# Patient Record
Sex: MALE | Race: WHITE | Employment: FULL TIME | URBAN - NONMETROPOLITAN AREA
[De-identification: names, ages, dates, MRNs, and addresses within clinical notes are randomized per-mention and may not be internally consistent; named-entity substitution may affect disease eponyms.]

---

## 2017-10-23 DIAGNOSIS — Z00.00 ROUTINE GENERAL MEDICAL EXAMINATION AT A HEALTH CARE FACILITY: Primary | ICD-10-CM

## 2017-12-07 ENCOUNTER — TELEPHONE (OUTPATIENT)
Dept: INTERNAL MEDICINE | Age: 57
End: 2017-12-07

## 2017-12-14 DIAGNOSIS — Z00.00 ROUTINE GENERAL MEDICAL EXAMINATION AT A HEALTH CARE FACILITY: ICD-10-CM

## 2017-12-14 LAB
ALBUMIN SERPL-MCNC: 4.2 G/DL (ref 3.5–5.2)
ALP BLD-CCNC: 72 U/L (ref 40–130)
ALT SERPL-CCNC: 16 U/L (ref 5–41)
ANION GAP SERPL CALCULATED.3IONS-SCNC: 13 MMOL/L (ref 7–19)
AST SERPL-CCNC: 16 U/L (ref 5–40)
BILIRUB SERPL-MCNC: 0.5 MG/DL (ref 0.2–1.2)
BILIRUBIN URINE: NEGATIVE
BLOOD, URINE: NEGATIVE
BUN BLDV-MCNC: 20 MG/DL (ref 6–20)
CALCIUM SERPL-MCNC: 8.8 MG/DL (ref 8.6–10)
CHLORIDE BLD-SCNC: 104 MMOL/L (ref 98–111)
CHOLESTEROL, TOTAL: 164 MG/DL (ref 160–199)
CLARITY: CLEAR
CO2: 27 MMOL/L (ref 22–29)
COLOR: YELLOW
CREAT SERPL-MCNC: 0.9 MG/DL (ref 0.5–1.2)
GFR NON-AFRICAN AMERICAN: >60
GLUCOSE BLD-MCNC: 97 MG/DL (ref 74–109)
GLUCOSE URINE: NEGATIVE MG/DL
HCT VFR BLD CALC: 47.4 % (ref 42–52)
HDLC SERPL-MCNC: 57 MG/DL (ref 55–121)
HEMOGLOBIN: 15.9 G/DL (ref 14–18)
KETONES, URINE: NEGATIVE MG/DL
LDL CHOLESTEROL CALCULATED: 87 MG/DL
LEUKOCYTE ESTERASE, URINE: NEGATIVE
MCH RBC QN AUTO: 29.2 PG (ref 27–31)
MCHC RBC AUTO-ENTMCNC: 33.5 G/DL (ref 33–37)
MCV RBC AUTO: 87 FL (ref 80–94)
NITRITE, URINE: NEGATIVE
PDW BLD-RTO: 12.7 % (ref 11.5–14.5)
PH UA: 6
PLATELET # BLD: 205 K/UL (ref 130–400)
PMV BLD AUTO: 11.4 FL (ref 9.4–12.4)
POTASSIUM SERPL-SCNC: 4.1 MMOL/L (ref 3.5–5)
PROSTATE SPECIFIC ANTIGEN: 5.77 NG/ML (ref 0–4)
PROTEIN UA: NEGATIVE MG/DL
RBC # BLD: 5.45 M/UL (ref 4.7–6.1)
SODIUM BLD-SCNC: 144 MMOL/L (ref 136–145)
SPECIFIC GRAVITY UA: 1.02
TOTAL PROTEIN: 6.8 G/DL (ref 6.6–8.7)
TRIGL SERPL-MCNC: 102 MG/DL (ref 0–149)
TSH SERPL DL<=0.05 MIU/L-ACNC: 2.69 UIU/ML (ref 0.27–4.2)
UROBILINOGEN, URINE: 0.2 E.U./DL
WBC # BLD: 5 K/UL (ref 4.8–10.8)

## 2017-12-21 ENCOUNTER — OFFICE VISIT (OUTPATIENT)
Dept: INTERNAL MEDICINE | Age: 57
End: 2017-12-21
Payer: COMMERCIAL

## 2017-12-21 VITALS
WEIGHT: 174 LBS | HEART RATE: 76 BPM | BODY MASS INDEX: 25.77 KG/M2 | DIASTOLIC BLOOD PRESSURE: 60 MMHG | HEIGHT: 69 IN | SYSTOLIC BLOOD PRESSURE: 110 MMHG | OXYGEN SATURATION: 98 %

## 2017-12-21 DIAGNOSIS — M19.90 OSTEOARTHRITIS, UNSPECIFIED OSTEOARTHRITIS TYPE, UNSPECIFIED SITE: ICD-10-CM

## 2017-12-21 DIAGNOSIS — K21.9 GASTROESOPHAGEAL REFLUX DISEASE WITHOUT ESOPHAGITIS: ICD-10-CM

## 2017-12-21 DIAGNOSIS — E78.00 HYPERCHOLESTEREMIA: Primary | ICD-10-CM

## 2017-12-21 DIAGNOSIS — N40.0 BENIGN PROSTATIC HYPERPLASIA WITHOUT LOWER URINARY TRACT SYMPTOMS: ICD-10-CM

## 2017-12-21 DIAGNOSIS — R97.20 ELEVATED PSA: ICD-10-CM

## 2017-12-21 PROBLEM — K58.8 OTHER IRRITABLE BOWEL SYNDROME: Status: ACTIVE | Noted: 2017-12-21

## 2017-12-21 PROCEDURE — 99215 OFFICE O/P EST HI 40 MIN: CPT | Performed by: INTERNAL MEDICINE

## 2017-12-21 ASSESSMENT — ENCOUNTER SYMPTOMS
WHEEZING: 0
ABDOMINAL DISTENTION: 0
SORE THROAT: 0
ABDOMINAL PAIN: 0
NAUSEA: 0
TROUBLE SWALLOWING: 0
EYE DISCHARGE: 0
VOMITING: 0
BLOOD IN STOOL: 0
SHORTNESS OF BREATH: 0
BACK PAIN: 0
EYE ITCHING: 0
SINUS PRESSURE: 0

## 2017-12-21 ASSESSMENT — PATIENT HEALTH QUESTIONNAIRE - PHQ9
1. LITTLE INTEREST OR PLEASURE IN DOING THINGS: 0
SUM OF ALL RESPONSES TO PHQ9 QUESTIONS 1 & 2: 0
2. FEELING DOWN, DEPRESSED OR HOPELESS: 0
SUM OF ALL RESPONSES TO PHQ QUESTIONS 1-9: 0

## 2017-12-21 NOTE — PROGRESS NOTES
[facility] INTERNAL MEDICINE  [address] [facility] [address]  Dept: [phone]  Dept Fax: [phone]: [phone]      Visit Date: 12/21/2017    Stevie Jamison is a 62 y.o. 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 Allyssa Ferrara who is in for his annual exam. He has chronic gastroesophageal reflux and takes Nexium on a when necessary basis. He also has a history of BPH and had to have a TURP back in 2009 in [place] due to obstructive symptoms. He has osteoarthritis with what sounds like olecranon bursitis in his left elbow and otherwise he's doing fine with no evidence of any complaints other than some postnasal drip    Social History   Substance Use Topics    Smoking status: Never Smoker    Smokeless tobacco: Never Used    Alcohol use Not on file      Current Outpatient Prescriptions   Medication Sig Dispense Refill    Esomeprazole Magnesium (NEXIUM 24HR PO) Take by mouth       No current facility-administered medications for this visit. No Known Allergies      Subjective:      Review of Systems   Constitutional: Negative for activity change, appetite change, fatigue and fever. HENT: Positive for postnasal drip. Negative for congestion, hearing loss, sinus pressure, sore throat and trouble swallowing. Eyes: Negative for discharge and itching. Respiratory: Negative for shortness of breath and wheezing. Cardiovascular: Negative for chest pain, palpitations and leg swelling. Gastrointestinal: Negative for abdominal distention, abdominal pain, blood in stool, nausea and vomiting. Endocrine: Negative for cold intolerance, heat intolerance and polydipsia. Genitourinary: Negative for flank pain, frequency, hematuria and urgency. Musculoskeletal: Positive for arthralgias. Negative for back pain and joint swelling.    Skin: Negative for rash and wound. Allergic/Immunologic: Negative for environmental allergies and food allergies. Neurological: Negative for dizziness, tremors, syncope, weakness, numbness and headaches. Hematological: Negative for adenopathy. Psychiatric/Behavioral: Negative for agitation and hallucinations. The patient is not nervous/anxious. Objective:     /60   Pulse 76   Ht 5' 9\" (1.753 m)   Wt 174 lb (78.9 kg)   SpO2 98%   BMI 25.70 kg/m²    Physical Exam   Constitutional: He is oriented to person, place, and time. He appears well-developed and well-nourished. No distress. HENT:   Head: Normocephalic and atraumatic. Right Ear: External ear normal.   Left Ear: External ear normal.   Nose: Nose normal.   Mouth/Throat: Oropharynx is clear and moist. No oropharyngeal exudate. Eyes: Conjunctivae and EOM are normal. Pupils are equal, round, and reactive to light. Right eye exhibits no discharge. Left eye exhibits no discharge. No scleral icterus. Neck: Normal range of motion. Neck supple. No JVD present. No tracheal deviation present. No thyromegaly present. Cardiovascular: Normal rate, regular rhythm, normal heart sounds and intact distal pulses. Exam reveals no gallop and no friction rub. No murmur heard. Pulmonary/Chest: Effort normal and breath sounds normal. No respiratory distress. He has no wheezes. He has no rales. Abdominal: Soft. Bowel sounds are normal. He exhibits no distension and no mass. There is no tenderness. There is no rebound and no guarding. Genitourinary:   Genitourinary Comments: Enlarged prostate with no evidence of any nodules   Musculoskeletal: Normal range of motion. He exhibits tenderness. He exhibits no edema or deformity. Elbow olecranon bursitis on left   Lymphadenopathy:     He has no cervical adenopathy. Neurological: He is alert and oriented to person, place, and time. He has normal reflexes. No cranial nerve deficit. He exhibits normal muscle tone. Coordination normal.   Skin: Skin is warm and dry. No rash noted. He is not diaphoretic. No erythema. No pallor. Psychiatric: He has a normal mood and affect. His behavior is normal. Judgment and thought content normal.        Assessment:     1. Gastroesophageal reflux disease without esophagitis     2. Benign prostatic hyperplasia without lower urinary tract symptoms     3. Osteoarthritis, unspecified osteoarthritis type, unspecified site     4. Elevated PSA  Amb External Referral To Urology            Plan:      Gastroesophageal reflux which is stable on his current dose of Nexium which she is taking when necessary. He will continue the same. BPH without any lower tract symptoms at this time. He has had a TURP in the past and he has seen Dr. Reggie Mckeon here in town. Osteoarthritis of the left elbow consistent with  olecranon bursitis area I told him to get an elbow strap to wear and use moist heat and ibuprofen on a when necessary basis. Elevated PSA. His PSA last year was 3.6 this year he is 5.77. He sees Dr. Reggie Mckeon and with his history of a TURP at a young age of [de-identified] needs to get back into see Dr. Reggie Mckeon again for follow-up on this elevation. make a referral back. She appears to be doing well. With the elevation of his PSA is being the only finding today were going to send him back to Dr. Reggie Mckeon and have him follow-up with Dr. Patel Spotted again in a year. Return in about 1 year (around 12/21/2018) for liver ,lipid check. Patient given educational materials - see patient instructions. Discussed use, benefit, and side effects of prescribed medications. All patient questions answered. Pt voiced understanding. Reviewed health maintenance. Instructed to continue current medications, diet and exercise. Patient agreed with treatment plan. **This report has been created using voice recognition software. It may contain minor errors which are inherent in voice recognition technology. **    Electronically signed by Scott Kulkarni MD on 12/21/2017 at 9:51 AM

## 2017-12-21 NOTE — PROGRESS NOTES
Visit Information    Have you changed or started any medications since your last visit including any over-the-counter medicines, vitamins, or herbal medicines? no   Are you having any side effects from any of your medications? -  no  Have you stopped taking any of your medications? Is so, why? -  no    Have you seen any other physician or provider since your last visit? No  Have you had any other diagnostic tests since your last visit? No  Have you been seen in the emergency room and/or had an admission to a hospital since we last saw you? No  Have you had your routine dental cleaning in the past 6 months? no    Have you activated your Departing account? If not, what are your barriers?  Yes     Patient Care Team:  Renee Hernandez MD as PCP - General (Internal Medicine)    Medical History Review  Past Medical, Family, and Social History reviewed and does not contribute to the patient presenting condition    Health Maintenance   Topic Date Due    Hepatitis C screen  1960    HIV screen  11/14/1975    Colon cancer screen colonoscopy  11/14/2010    Flu vaccine (1) 09/01/2017    Lipid screen  12/14/2022    DTaP/Tdap/Td vaccine (2 - Td) 08/05/2024

## 2018-01-18 ENCOUNTER — OFFICE VISIT (OUTPATIENT)
Dept: UROLOGY | Facility: CLINIC | Age: 58
End: 2018-01-18

## 2018-01-18 VITALS
BODY MASS INDEX: 34.95 KG/M2 | WEIGHT: 178 LBS | HEIGHT: 60 IN | TEMPERATURE: 97.2 F | DIASTOLIC BLOOD PRESSURE: 78 MMHG | SYSTOLIC BLOOD PRESSURE: 138 MMHG

## 2018-01-18 DIAGNOSIS — N40.1 BPH WITH URINARY OBSTRUCTION: ICD-10-CM

## 2018-01-18 DIAGNOSIS — R97.20 ELEVATED PROSTATE SPECIFIC ANTIGEN (PSA): Primary | ICD-10-CM

## 2018-01-18 DIAGNOSIS — N13.8 BPH WITH URINARY OBSTRUCTION: ICD-10-CM

## 2018-01-18 LAB
BILIRUB BLD-MCNC: NEGATIVE MG/DL
CLARITY, POC: CLEAR
COLOR UR: YELLOW
GLUCOSE UR STRIP-MCNC: NEGATIVE MG/DL
KETONES UR QL: NEGATIVE
LEUKOCYTE EST, POC: NEGATIVE
NITRITE UR-MCNC: NEGATIVE MG/ML
PH UR: 7 [PH] (ref 5–8)
PROT UR STRIP-MCNC: NEGATIVE MG/DL
RBC # UR STRIP: NEGATIVE /UL
SP GR UR: 1.02 (ref 1–1.03)
UROBILINOGEN UR QL: NORMAL

## 2018-01-18 PROCEDURE — 99214 OFFICE O/P EST MOD 30 MIN: CPT | Performed by: UROLOGY

## 2018-01-18 PROCEDURE — 81003 URINALYSIS AUTO W/O SCOPE: CPT | Performed by: UROLOGY

## 2018-01-18 RX ORDER — CEPHALEXIN 500 MG/1
500 CAPSULE ORAL 3 TIMES DAILY
Qty: 9 CAPSULE | Refills: 0 | Status: SHIPPED | OUTPATIENT
Start: 2018-01-18 | End: 2018-01-21

## 2018-01-18 NOTE — PROGRESS NOTES
"Subjective    Mr. Woodard is 57 y.o. male    Chief Complaint: Elevated PSA    History of Present Illness     Elevated PSA  Patient is here with an elevated PSA. The PSA was drawn4 week(s). He does not have a family history of prostate cancer. His AUA Symptom Score is 6 /35. Voiding symptoms include Frequency. Denies Incomplete emptying, Intermittency, Urgency, Straining and Nocturia. Voiding symptoms began several years  . These have been gradual in onset. none  Previous PSA values are : 5.77  No results found for: PSA         The following portions of the patient's history were reviewed and updated as appropriate: allergies, current medications, past family history, past medical history, past social history, past surgical history and problem list.    Review of Systems   Constitutional: Negative for chills and fever.   Gastrointestinal: Negative for abdominal pain, anal bleeding and blood in stool.   Genitourinary: Negative for decreased urine volume, flank pain, hematuria and urgency.       No current outpatient prescriptions on file.    History reviewed. No pertinent past medical history.    Past Surgical History:   Procedure Laterality Date   • PROSTATE SURGERY      TURP       Social History     Social History   • Marital status:      Spouse name: N/A   • Number of children: N/A   • Years of education: N/A     Social History Main Topics   • Smoking status: Never Smoker   • Smokeless tobacco: None   • Alcohol use No   • Drug use: None   • Sexual activity: Not Asked     Other Topics Concern   • None     Social History Narrative   • None       Family History   Problem Relation Age of Onset   • No Known Problems Father        Objective    /78  Temp 97.2 °F (36.2 °C)  Ht 69 cm (27.17\")  Wt 80.7 kg (178 lb)  .59 kg/m2    Physical Exam   Constitutional: He is oriented to person, place, and time. He appears well-developed and well-nourished.   Pulmonary/Chest: Effort normal.   Abdominal: Soft. He " exhibits no distension and no mass. There is no tenderness. There is no rebound and no guarding. No hernia.   Genitourinary: Penis normal. Rectal exam shows no mass, no tenderness and anal tone normal. Enlarged: for the age of the patient. Right testis shows no mass, no swelling and no tenderness. Left testis shows no mass, no swelling and no tenderness. No hypospadias. No discharge found.   Genitourinary Comments:  The urethral meatus normal in position without evidence of stricture. Epididymis without mass or tenderness. Vas Deferens is palpably normal.Anus and perineum without mass or tenderness. The prostate is approximately 50 ml. It is Symmetric, with a Soft consistency. There are no nodules present. . The seminal vesicles are Not palpable due to the size of the prostate.     Neurological: He is alert and oriented to person, place, and time.   Vitals reviewed.          Results for orders placed or performed in visit on 01/18/18   POC Urinalysis Dipstick, Automated   Result Value Ref Range    Color Yellow Yellow, Straw, Dark Yellow, Yanira    Clarity, UA Clear Clear    Glucose, UA Negative Negative, 1000 mg/dL (3+) mg/dL    Bilirubin Negative Negative    Ketones, UA Negative Negative    Specific Gravity  1.020 1.005 - 1.030    Blood, UA Negative Negative    pH, Urine 7.0 5.0 - 8.0    Protein, POC Negative Negative mg/dL    Urobilinogen, UA Normal Normal    Leukocytes Negative Negative    Nitrite, UA Negative Negative     Assessment and Plan    Corby was seen today for elevated psa.    Diagnoses and all orders for this visit:    Elevated prostate specific antigen (PSA)  -     POC Urinalysis Dipstick, Automated    BPH with urinary obstruction                Patient last seen in September 2015.  He returns today after having a PSA drawn from his primary care physician that showed a level 5.77.  He has a history of undergoing a TURP in 2009 by Dr. Lopez.  His AUA symptom score is 6/35 with frequency and weak  stream.After discussion with the patient we have opted to proceed with prostate biopsy.  We discussed risk of hematuria hematochezia and hematospermia.  Discussed the 4% risk of sepsis.  We will proceed with this in the very near future.

## 2018-02-07 ENCOUNTER — TELEPHONE (OUTPATIENT)
Dept: UROLOGY | Facility: CLINIC | Age: 58
End: 2018-02-07

## 2018-02-07 NOTE — TELEPHONE ENCOUNTER
Attempted to call patient and went straight to voicemail and I was unable to leave a voicemail. Patient is schd for a bx 2/8/18 at 7:30. Need to confirm he has started antibiotics, remind him to do a fleets enema, and no asa/bt starting 7 days prior

## 2018-02-08 ENCOUNTER — PROCEDURE VISIT (OUTPATIENT)
Dept: UROLOGY | Facility: CLINIC | Age: 58
End: 2018-02-08

## 2018-02-08 DIAGNOSIS — R97.20 ELEVATED PROSTATE SPECIFIC ANTIGEN (PSA): Primary | ICD-10-CM

## 2018-02-08 LAB
BILIRUB BLD-MCNC: NEGATIVE MG/DL
CLARITY, POC: CLEAR
COLOR UR: YELLOW
GLUCOSE UR STRIP-MCNC: NEGATIVE MG/DL
KETONES UR QL: NEGATIVE
LEUKOCYTE EST, POC: NEGATIVE
NITRITE UR-MCNC: NEGATIVE MG/ML
PH UR: 6 [PH] (ref 5–8)
PROT UR STRIP-MCNC: NEGATIVE MG/DL
RBC # UR STRIP: NEGATIVE /UL
SP GR UR: 1.01 (ref 1–1.03)
UROBILINOGEN UR QL: NORMAL

## 2018-02-08 PROCEDURE — 76872 US TRANSRECTAL: CPT | Performed by: UROLOGY

## 2018-02-08 PROCEDURE — 81003 URINALYSIS AUTO W/O SCOPE: CPT | Performed by: UROLOGY

## 2018-02-08 PROCEDURE — G0416 PROSTATE BIOPSY, ANY MTHD: HCPCS | Performed by: UROLOGY

## 2018-02-08 PROCEDURE — 76942 ECHO GUIDE FOR BIOPSY: CPT | Performed by: UROLOGY

## 2018-02-08 PROCEDURE — 96372 THER/PROPH/DIAG INJ SC/IM: CPT | Performed by: UROLOGY

## 2018-02-08 PROCEDURE — 55700 PR PROSTATE NEEDLE BIOPSY ANY APPROACH: CPT | Performed by: UROLOGY

## 2018-02-08 RX ORDER — GENTAMICIN SULFATE 40 MG/ML
80 INJECTION, SOLUTION INTRAMUSCULAR; INTRAVENOUS EVERY 12 HOURS
Status: COMPLETED | OUTPATIENT
Start: 2018-02-08 | End: 2018-02-08

## 2018-02-08 RX ADMIN — GENTAMICIN SULFATE 80 MG: 40 INJECTION, SOLUTION INTRAMUSCULAR; INTRAVENOUS at 10:21

## 2018-02-08 NOTE — PROGRESS NOTES
Indications:  Elevated PSA    Pre-operative prep:  fleets enema, oral antibiotic, IM gentamicin and stopped aspirin, coumadin, and other anticoagulants    Last PSA:  5.77    Procedure:    After proper identification of patient and procedure, patient was placed in the left later decubitus position.  2% lidocaine jelly was instilled per rectum  for topical anesthesia.  The ultrasound probe was gently inserted per rectum. Prostate was scanned from the base of the bladder to the apex.  20 cc of 1% lidocaine plain was then used to perform a prostate nerve block injecting the junction of the seminal vesicle and bladder laterally.      Prostate length: 4.8 cm    Prostate width: 4.99 cm    Prostate height: 2.84 cm    Prostate volume: 35.5 cc    PSA density: 0.16    Abnormal findings:  Hypoechoic lesions right base and TUR Defect    Median lobe:  no    A total of 12 biopsies were taken from the base, apex, and mid portion of the gland on both the right and the left sides.     Patient tolerated the procedure well    Complications: none    Estimated blood loss: minimal    Mr. Woodard was given instructions for follow up.  He will notify the office if he has excessive hematuria, hematochezia, fevers, perineal, or abdominal pain.    Follow up:   Call with pathology results and schedule follow up.

## 2018-02-09 LAB
CYTO UR: NORMAL
LAB AP CASE REPORT: NORMAL
LAB AP DIAGNOSIS COMMENT: NORMAL
LAB AP INTRADEPARTMENTAL CONSULT: NORMAL
Lab: NORMAL
PATH REPORT.FINAL DX SPEC: NORMAL
PATH REPORT.GROSS SPEC: NORMAL

## 2018-02-12 DIAGNOSIS — R97.20 ELEVATED PROSTATE SPECIFIC ANTIGEN (PSA): Primary | ICD-10-CM

## 2018-02-15 ENCOUNTER — TELEPHONE (OUTPATIENT)
Dept: UROLOGY | Facility: CLINIC | Age: 58
End: 2018-02-15

## 2018-02-15 NOTE — TELEPHONE ENCOUNTER
I spoke to patient and made him an appointment for tomorrow morning at 8. He will go to the ER if symptoms worsen.

## 2018-02-15 NOTE — TELEPHONE ENCOUNTER
----- Message from Samanta Davila sent at 2/15/2018  2:33 PM CST -----  Patient had a biopsy last week. He said for awhile he had burning when he urinates. Now he is having difficulty urinating. He wants to know if this is normal after a week for a biopsy. He can be reached on [phone].

## 2018-02-16 ENCOUNTER — OFFICE VISIT (OUTPATIENT)
Dept: UROLOGY | Facility: CLINIC | Age: 58
End: 2018-02-16

## 2018-02-16 VITALS
BODY MASS INDEX: 26.22 KG/M2 | HEIGHT: 69 IN | DIASTOLIC BLOOD PRESSURE: 78 MMHG | WEIGHT: 177 LBS | TEMPERATURE: 97.8 F | SYSTOLIC BLOOD PRESSURE: 130 MMHG

## 2018-02-16 DIAGNOSIS — N41.0 ACUTE PROSTATITIS: ICD-10-CM

## 2018-02-16 DIAGNOSIS — R97.20 ELEVATED PSA: ICD-10-CM

## 2018-02-16 DIAGNOSIS — N13.8 BPH WITH URINARY OBSTRUCTION: ICD-10-CM

## 2018-02-16 DIAGNOSIS — R39.198 DIFFICULTY URINATING: Primary | ICD-10-CM

## 2018-02-16 DIAGNOSIS — N40.1 BPH WITH URINARY OBSTRUCTION: ICD-10-CM

## 2018-02-16 LAB
BILIRUB BLD-MCNC: NEGATIVE MG/DL
CLARITY, POC: CLEAR
COLOR UR: YELLOW
GLUCOSE UR STRIP-MCNC: NEGATIVE MG/DL
KETONES UR QL: NEGATIVE
LEUKOCYTE EST, POC: NEGATIVE
NITRITE UR-MCNC: NEGATIVE MG/ML
PH UR: 6.5 [PH] (ref 5–8)
PROT UR STRIP-MCNC: NEGATIVE MG/DL
RBC # UR STRIP: ABNORMAL /UL
SP GR UR: 1.02 (ref 1–1.03)
UROBILINOGEN UR QL: NORMAL

## 2018-02-16 PROCEDURE — 87086 URINE CULTURE/COLONY COUNT: CPT | Performed by: UROLOGY

## 2018-02-16 PROCEDURE — 87186 SC STD MICRODIL/AGAR DIL: CPT | Performed by: UROLOGY

## 2018-02-16 PROCEDURE — 87088 URINE BACTERIA CULTURE: CPT | Performed by: UROLOGY

## 2018-02-16 PROCEDURE — 81003 URINALYSIS AUTO W/O SCOPE: CPT | Performed by: UROLOGY

## 2018-02-16 PROCEDURE — 99214 OFFICE O/P EST MOD 30 MIN: CPT | Performed by: UROLOGY

## 2018-02-16 PROCEDURE — 51798 US URINE CAPACITY MEASURE: CPT | Performed by: UROLOGY

## 2018-02-16 RX ORDER — SULFAMETHOXAZOLE AND TRIMETHOPRIM 800; 160 MG/1; MG/1
1 TABLET ORAL 2 TIMES DAILY
Qty: 90 TABLET | Refills: 0 | Status: SHIPPED | OUTPATIENT
Start: 2018-02-16 | End: 2018-03-30

## 2018-02-16 RX ORDER — TAMSULOSIN HYDROCHLORIDE 0.4 MG/1
1 CAPSULE ORAL NIGHTLY
Qty: 30 CAPSULE | Refills: 1 | Status: SHIPPED | OUTPATIENT
Start: 2018-02-16 | End: 2018-03-30

## 2018-02-16 NOTE — PROGRESS NOTES
Subjective    Mr. Woodard is 57 y.o. male    Chief Complaint: LUTS/Elevated PSA/BPH    History of Present Illness     Elevated PSA  Patient is here with an elevated PSA. The PSA was drawn4 week(s). He does not have a family history of prostate cancer. His AUA Symptom Score is 23 /35. Voiding symptoms include Frequency. Denies Incomplete emptying, Intermittency, Urgency, Straining and Nocturia. Voiding symptoms began several years  . These have been gradual in onset. Negative bx 2/18  Previous PSA values are : 5.77    The following portions of the patient's history were reviewed and updated as appropriate: allergies, current medications, past family history, past medical history, past social history, past surgical history and problem list.    Review of Systems   Constitutional: Negative for chills and fever.   Gastrointestinal: Negative for abdominal pain, anal bleeding and blood in stool.   Genitourinary: Positive for decreased urine volume and frequency. Negative for flank pain and hematuria.         Current Outpatient Prescriptions:   •  sulfamethoxazole-trimethoprim (BACTRIM DS) 800-160 MG per tablet, Take 1 tablet by mouth 2 (Two) Times a Day for 45 days., Disp: 90 tablet, Rfl: 0  •  tamsulosin (FLOMAX) 0.4 MG capsule 24 hr capsule, Take 1 capsule by mouth Every Night., Disp: 30 capsule, Rfl: 1    History reviewed. No pertinent past medical history.    Past Surgical History:   Procedure Laterality Date   • PROSTATE SURGERY      TURP       Social History     Social History   • Marital status:      Spouse name: N/A   • Number of children: N/A   • Years of education: N/A     Social History Main Topics   • Smoking status: Never Smoker   • Smokeless tobacco: Never Used   • Alcohol use No   • Drug use: None   • Sexual activity: Not Asked     Other Topics Concern   • None     Social History Narrative       Family History   Problem Relation Age of Onset   • No Known Problems Father        Objective    /78   "Temp 97.8 °F (36.6 °C)  Ht 175.3 cm (69\")  Wt 80.3 kg (177 lb)  BMI 26.14 kg/m2    Physical Exam   Constitutional: He is oriented to person, place, and time. He appears well-developed and well-nourished.   Pulmonary/Chest: Effort normal.   Abdominal: Soft. He exhibits no distension and no mass. There is no tenderness. There is no rebound and no guarding. No hernia.   Genitourinary: Penis normal. Rectal exam shows no mass, no tenderness and anal tone normal. Enlarged: for the age of the patient. Right testis shows no mass, no swelling and no tenderness. Left testis shows no mass, no swelling and no tenderness. No hypospadias. No discharge found.   Genitourinary Comments:  The urethral meatus normal in position without evidence of stricture. Epididymis without mass or tenderness. Vas Deferens is palpably normal.Anus and perineum without mass or tenderness. The prostate is approximately 35 ml. It is Symmetric, with a Boggy consistency. There are no nodules present. . The seminal vesicles are Not palpable due to the size of the prostate.     Neurological: He is alert and oriented to person, place, and time.   Vitals reviewed.          Results for orders placed or performed in visit on 02/16/18   POC Urinalysis Dipstick, Automated   Result Value Ref Range    Color Yellow Yellow, Straw, Dark Yellow, Yanira    Clarity, UA Clear Clear    Glucose, UA Negative Negative, 1000 mg/dL (3+) mg/dL    Bilirubin Negative Negative    Ketones, UA Negative Negative    Specific Gravity  1.020 1.005 - 1.030    Blood, UA Small (A) Negative    pH, Urine 6.5 5.0 - 8.0    Protein, POC Negative Negative mg/dL    Urobilinogen, UA Normal Normal    Leukocytes Negative Negative    Nitrite, UA Negative Negative     Assessment and Plan  Bladder Scan interpretation  Estimation of residual urine via abdominal ultrasound  Residual Urine: 0 ml  Indication: difficulty urinating  Position: Supine  Examination: Incremental scanning of the suprapubic area " using 3 MHz transducer using copious amounts of acoustic gel.   Findings: An anechoic area was demonstrated which represented the bladder, with measurement of residual urine as noted. I inspected this myself. In that the residual urine was stable or insignificant, no treatment will be necessary at this time.       Corby was seen today for urinary retention.    Diagnoses and all orders for this visit:    Difficulty urinating  -     POC Urinalysis Dipstick, Automated  -     Urine Culture - Urine, Urine, Clean Catch    BPH with urinary obstruction  -     tamsulosin (FLOMAX) 0.4 MG capsule 24 hr capsule; Take 1 capsule by mouth Every Night.    Elevated PSA    Acute prostatitis  -     sulfamethoxazole-trimethoprim (BACTRIM DS) 800-160 MG per tablet; Take 1 tablet by mouth 2 (Two) Times a Day for 45 days.            Patient with post prostate biopsy infection.  I think clinically he has prostatitis and will treat him for 6 weeks of Bactrim.  Also start him on Flomax.  He has self cath before it is okay with doing this.  He is okay to do this on a when necessary basis.  He will follow-up with me in 6 weeks

## 2018-02-18 LAB
BACTERIA SPEC AEROBE CULT: ABNORMAL
BACTERIA SPEC AEROBE CULT: ABNORMAL

## 2018-02-19 ENCOUNTER — TELEPHONE (OUTPATIENT)
Dept: UROLOGY | Facility: CLINIC | Age: 58
End: 2018-02-19

## 2018-02-19 DIAGNOSIS — N41.9 PROSTATITIS, UNSPECIFIED PROSTATITIS TYPE: Primary | ICD-10-CM

## 2018-02-19 RX ORDER — NITROFURANTOIN MACROCRYSTALS 100 MG/1
100 CAPSULE ORAL 2 TIMES DAILY
Qty: 30 CAPSULE | Refills: 1 | Status: SHIPPED | OUTPATIENT
Start: 2018-02-19 | End: 2018-03-21

## 2018-03-26 NOTE — PROGRESS NOTES
"Subjective    Mr. Woodard is 57 y.o. male    Chief Complaint: LUTS/Elevated PSA/BPH    History of Present Illness    Elevated PSA  Patient is here with an elevated PSA. The PSA was drawn4 week(s). He does not have a family history of prostate cancer. His AUA Symptom Score is 16/35. Voiding symptoms include Frequency. Denies Incomplete emptying, Intermittency, Urgency, Straining and Nocturia. Voiding symptoms began several years  . These have been gradual in onset. Negative bx 2/18  Previous PSA values are : 5.77     The following portions of the patient's history were reviewed and updated as appropriate: allergies, current medications, past family history, past medical history, past social history, past surgical history and problem list.    Review of Systems   Constitutional: Negative for chills and fever.   Gastrointestinal: Negative for abdominal pain, anal bleeding and blood in stool.   Genitourinary: Positive for dysuria and urgency. Negative for flank pain and hematuria.       No current outpatient prescriptions on file.    History reviewed. No pertinent past medical history.    Past Surgical History:   Procedure Laterality Date   • PROSTATE SURGERY      TURP       Social History     Social History   • Marital status:      Social History Main Topics   • Smoking status: Never Smoker   • Smokeless tobacco: Never Used   • Alcohol use No   • Drug use: Unknown     Other Topics Concern   • Not on file       Family History   Problem Relation Age of Onset   • No Known Problems Father        Objective    Temp 97.3 °F (36.3 °C)   Ht 175.3 cm (69\")   Wt 116 kg (255 lb)   BMI 37.66 kg/m²     Physical Exam   Constitutional: He is oriented to person, place, and time. He appears well-developed and well-nourished.   Pulmonary/Chest: Effort normal.   Abdominal: Soft. He exhibits no distension and no mass. There is no tenderness. There is no rebound and no guarding. No hernia.   Genitourinary: Penis normal. Rectal exam " shows no mass, no tenderness and anal tone normal. Enlarged: for the age of the patient. Right testis shows no mass, no swelling and no tenderness. Left testis shows no mass, no swelling and no tenderness. No hypospadias. No discharge found.   Genitourinary Comments:  The urethral meatus normal in position without evidence of stricture. Epididymis without mass or tenderness. Vas Deferens is palpably normal.Anus and perineum without mass or tenderness. The prostate is approximately 40 ml. It is Symmetric, with a Soft consistency. There are no nodules present. . The seminal vesicles are Not palpable due to the size of the prostate.     Neurological: He is alert and oriented to person, place, and time.   Vitals reviewed.          Results for orders placed or performed in visit on 03/30/18   POC Urinalysis Dipstick, Automated   Result Value Ref Range    Color Yellow Yellow, Straw, Dark Yellow, Yanira    Clarity, UA Cloudy (A) Clear    Glucose, UA Negative Negative, 1000 mg/dL (3+) mg/dL    Bilirubin Negative Negative    Ketones, UA Negative Negative    Specific Gravity  1.020 1.005 - 1.030    Blood, UA Moderate (A) Negative    pH, Urine 7.0 5.0 - 8.0    Protein,  mg/dL (A) Negative mg/dL    Urobilinogen, UA Normal Normal    Leukocytes Large (3+) (A) Negative    Nitrite, UA Negative Negative     Assessment and Plan    Diagnoses and all orders for this visit:    Elevated PSA  -     POC Urinalysis Dipstick, Automated  -     PSA, Total & Free; Future    BPH with urinary obstruction    Dysuria  -     Urine Culture - Urine, Urine, Clean Catch        Patient with a history of TURP in 2009 by Dr. Lopez.  He had a PSA of 5.77 and February 2018 underwent a prostate biopsy which was negative for any malignancy.       His biopsy was complicated by a post-biopsy prostatitis and he has had 6 weeks of Macrodantin for Enterococcus.  His urine today does have moderate blood and large leukocytes.  He still having some dysuria.I  will send today's urine for culture.  He will follow-up in 6 months with PSA and percent free.

## 2018-03-30 ENCOUNTER — OFFICE VISIT (OUTPATIENT)
Dept: UROLOGY | Facility: CLINIC | Age: 58
End: 2018-03-30

## 2018-03-30 VITALS — TEMPERATURE: 97.3 F | BODY MASS INDEX: 25.92 KG/M2 | WEIGHT: 175 LBS | HEIGHT: 69 IN

## 2018-03-30 DIAGNOSIS — R30.0 DYSURIA: ICD-10-CM

## 2018-03-30 DIAGNOSIS — R97.20 ELEVATED PSA: Primary | ICD-10-CM

## 2018-03-30 DIAGNOSIS — N40.1 BPH WITH URINARY OBSTRUCTION: ICD-10-CM

## 2018-03-30 DIAGNOSIS — N13.8 BPH WITH URINARY OBSTRUCTION: ICD-10-CM

## 2018-03-30 LAB
BILIRUB BLD-MCNC: NEGATIVE MG/DL
CLARITY, POC: ABNORMAL
COLOR UR: YELLOW
GLUCOSE UR STRIP-MCNC: NEGATIVE MG/DL
KETONES UR QL: NEGATIVE
LEUKOCYTE EST, POC: ABNORMAL
NITRITE UR-MCNC: NEGATIVE MG/ML
PH UR: 7 [PH] (ref 5–8)
PROT UR STRIP-MCNC: ABNORMAL MG/DL
RBC # UR STRIP: ABNORMAL /UL
SP GR UR: 1.02 (ref 1–1.03)
UROBILINOGEN UR QL: NORMAL

## 2018-03-30 PROCEDURE — 87086 URINE CULTURE/COLONY COUNT: CPT | Performed by: UROLOGY

## 2018-03-30 PROCEDURE — 99213 OFFICE O/P EST LOW 20 MIN: CPT | Performed by: UROLOGY

## 2018-03-30 PROCEDURE — 87186 SC STD MICRODIL/AGAR DIL: CPT | Performed by: UROLOGY

## 2018-03-30 PROCEDURE — 81001 URINALYSIS AUTO W/SCOPE: CPT | Performed by: UROLOGY

## 2018-03-30 PROCEDURE — 87088 URINE BACTERIA CULTURE: CPT | Performed by: UROLOGY

## 2018-03-30 NOTE — PATIENT INSTRUCTIONS

## 2018-04-01 LAB
BACTERIA SPEC AEROBE CULT: ABNORMAL
BACTERIA SPEC AEROBE CULT: ABNORMAL

## 2018-04-02 RX ORDER — LEVOFLOXACIN 500 MG/1
500 TABLET, FILM COATED ORAL DAILY
Qty: 7 TABLET | Refills: 0 | Status: SHIPPED | OUTPATIENT
Start: 2018-04-02 | End: 2018-04-09

## 2018-05-10 ENCOUNTER — PROCEDURE VISIT (OUTPATIENT)
Dept: UROLOGY | Facility: CLINIC | Age: 58
End: 2018-05-10

## 2018-05-10 ENCOUNTER — TELEPHONE (OUTPATIENT)
Dept: UROLOGY | Facility: CLINIC | Age: 58
End: 2018-05-10

## 2018-05-10 VITALS
HEIGHT: 69 IN | BODY MASS INDEX: 26.07 KG/M2 | DIASTOLIC BLOOD PRESSURE: 76 MMHG | WEIGHT: 176 LBS | TEMPERATURE: 97.9 F | SYSTOLIC BLOOD PRESSURE: 124 MMHG

## 2018-05-10 DIAGNOSIS — N39.0 URINARY TRACT INFECTION WITHOUT HEMATURIA, SITE UNSPECIFIED: Primary | ICD-10-CM

## 2018-05-10 LAB
BILIRUB BLD-MCNC: NEGATIVE MG/DL
CLARITY, POC: CLEAR
COLOR UR: YELLOW
GLUCOSE UR STRIP-MCNC: NEGATIVE MG/DL
KETONES UR QL: NEGATIVE
LEUKOCYTE EST, POC: ABNORMAL
NITRITE UR-MCNC: NEGATIVE MG/ML
PH UR: 6 [PH] (ref 5–8)
PROT UR STRIP-MCNC: ABNORMAL MG/DL
RBC # UR STRIP: ABNORMAL /UL
SP GR UR: 1.02 (ref 1–1.03)
UROBILINOGEN UR QL: NORMAL

## 2018-05-10 PROCEDURE — 81003 URINALYSIS AUTO W/O SCOPE: CPT | Performed by: UROLOGY

## 2018-05-10 PROCEDURE — 87086 URINE CULTURE/COLONY COUNT: CPT | Performed by: UROLOGY

## 2018-05-10 NOTE — PROGRESS NOTES
Patient of Dr. Payan is here today complaining of burning and urgency. The patient denies any fever,nausea,chills or vomiting. Clean catch urine obtained. UA Auto Dip ran and urine sent for C&S.    Dr. Payan was in the office at the time of procedure. Patient was advised that we will call with results. Patient verbalized understanding.       Reviewed and agree with above.

## 2018-05-10 NOTE — TELEPHONE ENCOUNTER
Patient calls stating he is still having signs of prostate infection. He has finished his antibiotic but now has burning, pain, and urgency. He wants to know if we will give more antibiotic or should he be seen?      Per Tammy CONNOLLY patient coming in for C&S this afternoon. Then he will need to follow up with Dr Payan.

## 2018-05-11 ENCOUNTER — TELEPHONE (OUTPATIENT)
Dept: UROLOGY | Facility: CLINIC | Age: 58
End: 2018-05-11

## 2018-05-11 NOTE — TELEPHONE ENCOUNTER
Pt is continuing to have increasing pain and frequency but his culture from 5/10/18 came back negative for growth. He would like to see Dr Payan but there are no open appts until Friday 5/18/18. Would Dr Payan ok to see him sooner?

## 2018-05-12 LAB
BACTERIA SPEC AEROBE CULT: ABNORMAL
BACTERIA SPEC AEROBE CULT: ABNORMAL

## 2018-05-15 NOTE — TELEPHONE ENCOUNTER
Called pt to offer appt on 5/17/18. He states that he has work meetings and can not come either Thurs or Friday this week. He is still having UTI like symptoms. I offered for him to come in today and have another culture sent. He was reluctant and I also mentioned that he could try AZO. He said he might do that and he would call if symptoms don't get better.

## 2018-06-01 NOTE — PROGRESS NOTES
"Subjective    Mr. Woodard is 57 y.o. male    Chief Complaint: LUTS/Elevated PSA/BPH    History of Present Illness    Elevated PSA  Patient is here with an elevated PSA. The PSA was drawn4 week(s). He does not have a family history of prostate cancer. His AUA Symptom Score is 16/35. Voiding symptoms include Frequency. Denies Incomplete emptying, Intermittency, Urgency, Straining and Nocturia. Voiding symptoms began several years  . These have been gradual in onset. Negative bx 2/18  Previous PSA values are : 5.77    The following portions of the patient's history were reviewed and updated as appropriate: allergies, current medications, past family history, past medical history, past social history, past surgical history and problem list.    Review of Systems   Constitutional: Negative for chills and fever.   Gastrointestinal: Negative for abdominal pain, anal bleeding and blood in stool.   Genitourinary: Positive for frequency and urgency. Negative for flank pain and hematuria.       No current outpatient prescriptions on file.    History reviewed. No pertinent past medical history.    Past Surgical History:   Procedure Laterality Date   • PROSTATE SURGERY      TURP       Social History     Social History   • Marital status:      Social History Main Topics   • Smoking status: Never Smoker   • Smokeless tobacco: Never Used   • Alcohol use No   • Drug use: Unknown     Other Topics Concern   • Not on file       Family History   Problem Relation Age of Onset   • No Known Problems Father        Objective    Temp 98.4 °F (36.9 °C)   Ht 175.3 cm (69\")   Wt 79.1 kg (174 lb 6.4 oz)   BMI 25.75 kg/m²     Physical Exam   Constitutional: He is oriented to person, place, and time. He appears well-developed and well-nourished.   Pulmonary/Chest: Effort normal.   Abdominal: Soft. He exhibits no distension and no mass. There is no tenderness. There is no rebound and no guarding. No hernia.   Genitourinary: Penis normal. " Rectal exam shows no mass, no tenderness and anal tone normal. Enlarged: for the age of the patient. Right testis shows no mass, no swelling and no tenderness. Left testis shows no mass, no swelling and no tenderness. No hypospadias. No discharge found.   Genitourinary Comments:  The urethral meatus normal in position without evidence of stricture. Epididymis without mass or tenderness. Vas Deferens is palpably normal.Anus and perineum without mass or tenderness. The prostate is approximately 35 ml. It is Symmetric, with a Boggy consistency. There are no nodules present. . The seminal vesicles are Not palpable due to the size of the prostate.     Neurological: He is alert and oriented to person, place, and time.   Vitals reviewed.          Results for orders placed or performed in visit on 06/04/18   POC Urinalysis Dipstick, Multipro   Result Value Ref Range    Color Yellow Yellow, Straw, Dark Yellow, Yanira    Clarity, UA Cloudy (A) Clear    Glucose, UA Negative Negative, 1000 mg/dL (3+) mg/dL    Bilirubin Negative Negative    Ketones, UA Negative Negative    Specific Gravity  1.020 1.005 - 1.030    Blood, UA Small (A) Negative    pH, Urine 6.0 5.0 - 8.0    Protein, POC 2+ (A) Negative mg/dL    Urobilinogen, UA Normal Normal    Leukocytes Small (1+) (A) Negative    Nitrite, UA Negative Negative     Assessment and Plan    Estimation of residual urine via abdominal ultrasound  Residual Urine: >370 ml  Indication: Frequency  Position: Supine  Examination: Incremental scanning of the suprapubic area using 3 MHz transducer using copious amounts of acoustic gel.   Findings: An anechoic area was demonstrated which represented the bladder, with measurement of residual urine as noted. I inspected this myself. In that the residual urine was stable or insignificant, no treatment will be necessary at this time.       Microscopic Urinalysis  I inspected the urine myself based on the clinical situation including the dipstick  urine. The urine is spun in a centrifuge for three minutes. The spun urine shows 7-12 rbc/hpf, none wbc/hpf, none epi/hpf, negative bacteria, negative crystals, and negative casts.       Corby was seen today for urinary frequency.    Diagnoses and all orders for this visit:    Urinary tract infection without hematuria, site unspecified  -     POC Urinalysis Dipstick, Multipro  -     Urine Culture - Urine, Urine, Clean Catch  -     CT Abdomen Pelvis With Contrast; Future    BPH with urinary obstruction  -     CT Abdomen Pelvis With Contrast; Future    Retention, urine    Dysuria  -     CT Abdomen Pelvis With Contrast; Future    Elevated prostate specific antigen (PSA)        Patient with a history of TURP in 2009 by Dr. Lopez.  He had a PSA of 5.77 and February 2018 underwent a prostate biopsy which was negative for any malignancy.TRUS volume was 35.5 mL.         His biopsy was complicated by a post-biopsy prostatitis and he has had 6 weeks of Macrodantin for Enterococcus.  Repeat urine culture month later continue show enterococcus.  He had a culture in May which showed gram-positive kamilah.      He is still having significant symptoms and this scan his pelvis to make sure there is no prostate abscess.  Will follow-up next couple days with these

## 2018-06-04 ENCOUNTER — OFFICE VISIT (OUTPATIENT)
Dept: UROLOGY | Facility: CLINIC | Age: 58
End: 2018-06-04

## 2018-06-04 VITALS — TEMPERATURE: 98.4 F | WEIGHT: 174.4 LBS | HEIGHT: 69 IN | BODY MASS INDEX: 25.83 KG/M2

## 2018-06-04 DIAGNOSIS — R33.9 RETENTION, URINE: ICD-10-CM

## 2018-06-04 DIAGNOSIS — N39.0 URINARY TRACT INFECTION WITHOUT HEMATURIA, SITE UNSPECIFIED: Primary | ICD-10-CM

## 2018-06-04 DIAGNOSIS — R97.20 ELEVATED PROSTATE SPECIFIC ANTIGEN (PSA): ICD-10-CM

## 2018-06-04 DIAGNOSIS — N40.1 BPH WITH URINARY OBSTRUCTION: ICD-10-CM

## 2018-06-04 DIAGNOSIS — N13.8 BPH WITH URINARY OBSTRUCTION: ICD-10-CM

## 2018-06-04 DIAGNOSIS — R30.0 DYSURIA: ICD-10-CM

## 2018-06-04 LAB
BILIRUB BLD-MCNC: NEGATIVE MG/DL
CLARITY, POC: ABNORMAL
COLOR UR: YELLOW
GLUCOSE UR STRIP-MCNC: NEGATIVE MG/DL
KETONES UR QL: NEGATIVE
LEUKOCYTE EST, POC: ABNORMAL
NITRITE UR-MCNC: NEGATIVE MG/ML
PH UR: 6 [PH] (ref 5–8)
PROT UR STRIP-MCNC: ABNORMAL MG/DL
RBC # UR STRIP: ABNORMAL /UL
SP GR UR: 1.02 (ref 1–1.03)
UROBILINOGEN UR QL: NORMAL

## 2018-06-04 PROCEDURE — 51798 US URINE CAPACITY MEASURE: CPT | Performed by: UROLOGY

## 2018-06-04 PROCEDURE — 87186 SC STD MICRODIL/AGAR DIL: CPT | Performed by: UROLOGY

## 2018-06-04 PROCEDURE — 87086 URINE CULTURE/COLONY COUNT: CPT | Performed by: UROLOGY

## 2018-06-04 PROCEDURE — 87181 SC STD AGAR DILUTION PER AGT: CPT | Performed by: UROLOGY

## 2018-06-04 PROCEDURE — 81001 URINALYSIS AUTO W/SCOPE: CPT | Performed by: UROLOGY

## 2018-06-04 PROCEDURE — 87077 CULTURE AEROBIC IDENTIFY: CPT | Performed by: UROLOGY

## 2018-06-04 PROCEDURE — 99214 OFFICE O/P EST MOD 30 MIN: CPT | Performed by: UROLOGY

## 2018-06-04 PROCEDURE — 87088 URINE BACTERIA CULTURE: CPT | Performed by: UROLOGY

## 2018-06-04 NOTE — PATIENT INSTRUCTIONS

## 2018-06-06 NOTE — PROGRESS NOTES
"Subjective    Mr. Woodard is 57 y.o. male    Chief Complaint: LUTS/Elevated PSA/BPH    History of Present Illness    Elevated PSA  Patient is here with an elevated PSA. The PSA was drawn4 week(s). He does not have a family history of prostate cancer. His AUA Symptom Score is 16/35. Voiding symptoms include Frequency. Denies Incomplete emptying, Intermittency, Urgency, Straining and Nocturia. Voiding symptoms began several years  . These have been gradual in onset. Negative bx 2/18  Previous PSA values are : 5.77    The following portions of the patient's history were reviewed and updated as appropriate: allergies, current medications, past family history, past medical history, past social history, past surgical history and problem list.    Review of Systems   Constitutional: Negative for chills and fever.   Gastrointestinal: Negative for abdominal pain, anal bleeding and blood in stool.   Genitourinary: Positive for dysuria (Burning), frequency and urgency. Negative for flank pain and hematuria.       No current outpatient prescriptions on file.  No current facility-administered medications for this visit.     History reviewed. No pertinent past medical history.    Past Surgical History:   Procedure Laterality Date   • PROSTATE SURGERY      TURP       Social History     Social History   • Marital status:      Social History Main Topics   • Smoking status: Never Smoker   • Smokeless tobacco: Never Used   • Alcohol use No   • Drug use: Unknown     Other Topics Concern   • Not on file       Family History   Problem Relation Age of Onset   • No Known Problems Father        Objective    Temp 98.4 °F (36.9 °C)   Ht 175.3 cm (69\")   Wt 78.9 kg (174 lb)   BMI 25.70 kg/m²     Physical Exam   Constitutional: He is oriented to person, place, and time. He appears well-developed and well-nourished. No distress.   Pulmonary/Chest: Effort normal.   Abdominal: Soft. He exhibits no distension and no mass. There is no " tenderness. There is no rebound and no guarding. No hernia.   Neurological: He is alert and oriented to person, place, and time.   Skin: Skin is warm and dry. He is not diaphoretic.   Psychiatric: He has a normal mood and affect.   Vitals reviewed.    CT independent review  The CT scan of the abdomen/pelvis done without contrast is available for me to review.  Treatment recommendations require an independent review.  First I scanned the liver, spleen, and bowel pattern.  The retroperitoneum including the major vessels and lymphatic packages are briefly reviewed.  This film as been reviewed by the radiologist to determine any non urologic abnormalities that are present.  The kidneys are closely inspected for size, symmetry, contour, parenchymal thickness, perinephric reaction, presence of calcifications, and intrarenal dilation of the collecting system.  The ureters are inspected for their course, caliber, and any calcifications.  The bladder is inspected for its thickness, size, and presence of any calcifications.  This scan shows:    The right kidney appears normal on this non-contrasted CT scan.  The renal parenchymal is norml in thickness.  There are no solid masses or cysts.  There is no hydronephrosis.  There are no stones.      The left kidney appears normal on this non-contrasted CT scan.  The renal parenchymal is norml in thickness.  There are no solid masses or cysts.  There is no hydronephrosis.  There are no stones.      The bladder appears thickened bladder wall.          Results for orders placed or performed in visit on 06/07/18   POC Urinalysis Dipstick, Multipro   Result Value Ref Range    Color Yellow Yellow, Straw, Dark Yellow, Yanira    Clarity, UA Clear Clear    Glucose, UA Negative Negative, 1000 mg/dL (3+) mg/dL    Bilirubin Negative Negative    Ketones, UA Negative Negative    Specific Gravity  1.015 1.005 - 1.030    Blood, UA Small (A) Negative    pH, Urine 6.0 5.0 - 8.0    Protein, POC Trace  (A) Negative mg/dL    Urobilinogen, UA Normal Normal    Leukocytes Small (1+) (A) Negative    Nitrite, UA Negative Negative     Assessment and Plan    Corby was seen today for urinary tract infection without hematuria, site unspecified.    Diagnoses and all orders for this visit:    Urinary tract infection without hematuria, site unspecified  -     POC Urinalysis Dipstick, Multipro    BPH with urinary obstruction    Retention, urine        Patient with a history of TURP in 2009 by Dr. Lopez.  He had a PSA of 5.77 and February 2018 underwent a prostate biopsy which was negative for any malignancy.TRUS volume was 35.5 mL.         His biopsy was complicated by a post-biopsy prostatitis and he has had 6 weeks of Macrodantin for Enterococcus.  Repeat urine culture month later continue show enterococcus.  A culture that was drawn this week is in progress.    His CT scan is negative for any inflammation or abscess in the prostate. He does have a thickened bladder wall.     I have recommended a cystoscopy to evaluate for prostate regrowth versus bladder neck contracture he does have a weak stream and he is retaining urine.  He wants to think about that.  He has regular scheduled follow-up in September.  He will call if he wants to proceed with cystoscopic examination.

## 2018-06-07 ENCOUNTER — OFFICE VISIT (OUTPATIENT)
Dept: UROLOGY | Facility: CLINIC | Age: 58
End: 2018-06-07

## 2018-06-07 ENCOUNTER — HOSPITAL ENCOUNTER (OUTPATIENT)
Dept: CT IMAGING | Facility: HOSPITAL | Age: 58
Discharge: HOME | End: 2018-06-07
Attending: UROLOGY | Admitting: UROLOGY

## 2018-06-07 VITALS — TEMPERATURE: 98.4 F | HEIGHT: 69 IN | BODY MASS INDEX: 25.77 KG/M2 | WEIGHT: 174 LBS

## 2018-06-07 DIAGNOSIS — R33.9 RETENTION, URINE: ICD-10-CM

## 2018-06-07 DIAGNOSIS — N13.8 BPH WITH URINARY OBSTRUCTION: ICD-10-CM

## 2018-06-07 DIAGNOSIS — N40.1 BPH WITH URINARY OBSTRUCTION: ICD-10-CM

## 2018-06-07 DIAGNOSIS — R30.0 DYSURIA: ICD-10-CM

## 2018-06-07 DIAGNOSIS — N39.0 URINARY TRACT INFECTION WITHOUT HEMATURIA, SITE UNSPECIFIED: Primary | ICD-10-CM

## 2018-06-07 DIAGNOSIS — N39.0 URINARY TRACT INFECTION WITHOUT HEMATURIA, SITE UNSPECIFIED: ICD-10-CM

## 2018-06-07 LAB
BILIRUB BLD-MCNC: NEGATIVE MG/DL
CLARITY, POC: CLEAR
COLOR UR: YELLOW
CREAT BLDA-MCNC: 0.9 MG/DL (ref 0.6–1.3)
GLUCOSE UR STRIP-MCNC: NEGATIVE MG/DL
KETONES UR QL: NEGATIVE
LEUKOCYTE EST, POC: ABNORMAL
NITRITE UR-MCNC: NEGATIVE MG/ML
PH UR: 6 [PH] (ref 5–8)
PROT UR STRIP-MCNC: ABNORMAL MG/DL
RBC # UR STRIP: ABNORMAL /UL
SP GR UR: 1.01 (ref 1–1.03)
UROBILINOGEN UR QL: NORMAL

## 2018-06-07 PROCEDURE — 82565 ASSAY OF CREATININE: CPT

## 2018-06-07 PROCEDURE — 81001 URINALYSIS AUTO W/SCOPE: CPT | Performed by: UROLOGY

## 2018-06-07 PROCEDURE — 25010000002 IOPAMIDOL 61 % SOLUTION: Performed by: UROLOGY

## 2018-06-07 PROCEDURE — 99214 OFFICE O/P EST MOD 30 MIN: CPT | Performed by: UROLOGY

## 2018-06-07 PROCEDURE — 74177 CT ABD & PELVIS W/CONTRAST: CPT

## 2018-06-07 RX ADMIN — IOPAMIDOL 100 ML: 612 INJECTION, SOLUTION INTRAVENOUS at 08:45

## 2018-06-07 NOTE — PATIENT INSTRUCTIONS

## 2018-06-08 ENCOUNTER — TELEPHONE (OUTPATIENT)
Dept: INTERNAL MEDICINE | Age: 58
End: 2018-06-08

## 2018-06-11 LAB — BACTERIA SPEC AEROBE CULT: ABNORMAL

## 2018-06-12 ENCOUNTER — TELEPHONE (OUTPATIENT)
Dept: UROLOGY | Facility: CLINIC | Age: 58
End: 2018-06-12

## 2018-06-12 DIAGNOSIS — N39.0 URINARY TRACT INFECTION WITHOUT HEMATURIA, SITE UNSPECIFIED: Primary | ICD-10-CM

## 2018-06-12 RX ORDER — AMOXICILLIN AND CLAVULANATE POTASSIUM 875; 125 MG/1; MG/1
1 TABLET, FILM COATED ORAL 2 TIMES DAILY
Qty: 28 TABLET | Refills: 0 | Status: SHIPPED | OUTPATIENT
Start: 2018-06-12 | End: 2018-06-26

## 2018-06-12 NOTE — TELEPHONE ENCOUNTER
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 Pt asked that I call it in to [facility] in [place] because that is where he worked daily. Pt voiced understanding.

## 2018-06-19 ENCOUNTER — TELEPHONE (OUTPATIENT)
Dept: UROLOGY | Facility: CLINIC | Age: 58
End: 2018-06-19

## 2018-06-19 NOTE — TELEPHONE ENCOUNTER
I attempted to return the patient's call to inform him that even though he is feeling better, he still needs to get the procedure done since he has had several recurrent UTI's per Dr. Payan. Pt did not answer.

## 2018-06-20 NOTE — TELEPHONE ENCOUNTER
Spoke with patient and he is going to wait until after he sees Dr. Shirley on the 29th before he calls to schedule the Cysto.

## 2018-08-11 ENCOUNTER — RESULTS ENCOUNTER (OUTPATIENT)
Dept: UROLOGY | Facility: CLINIC | Age: 58
End: 2018-08-11

## 2018-08-11 DIAGNOSIS — R97.20 ELEVATED PROSTATE SPECIFIC ANTIGEN (PSA): ICD-10-CM

## 2018-09-26 ENCOUNTER — RESULTS ENCOUNTER (OUTPATIENT)
Dept: UROLOGY | Facility: CLINIC | Age: 58
End: 2018-09-26

## 2018-09-26 DIAGNOSIS — R97.20 ELEVATED PSA: ICD-10-CM

## 2018-11-12 ENCOUNTER — HOSPITAL ENCOUNTER (OUTPATIENT)
Dept: ULTRASOUND IMAGING | Facility: HOSPITAL | Age: 58
Discharge: HOME | End: 2018-11-12
Attending: INTERNAL MEDICINE | Admitting: INTERNAL MEDICINE

## 2018-11-12 ENCOUNTER — TRANSCRIBE ORDERS (OUTPATIENT)
Dept: GENERAL RADIOLOGY | Facility: HOSPITAL | Age: 58
End: 2018-11-12

## 2018-11-12 DIAGNOSIS — M79.605 PAIN IN BOTH LOWER EXTREMITIES: ICD-10-CM

## 2018-11-12 DIAGNOSIS — M79.604 PAIN IN BOTH LOWER EXTREMITIES: Primary | ICD-10-CM

## 2018-11-12 DIAGNOSIS — M79.605 PAIN IN BOTH LOWER EXTREMITIES: Primary | ICD-10-CM

## 2018-11-12 DIAGNOSIS — M79.604 PAIN IN BOTH LOWER EXTREMITIES: ICD-10-CM

## 2018-11-12 PROCEDURE — 93970 EXTREMITY STUDY: CPT

## 2020-07-06 ENCOUNTER — HOSPITAL ENCOUNTER (EMERGENCY)
Age: 60
Discharge: HOME | End: 2020-07-06
Payer: COMMERCIAL

## 2020-07-06 ENCOUNTER — APPOINTMENT (OUTPATIENT)
Dept: GENERAL RADIOLOGY | Age: 60
End: 2020-07-06
Payer: COMMERCIAL

## 2020-07-06 VITALS
HEIGHT: 69 IN | WEIGHT: 174 LBS | RESPIRATION RATE: 16 BRPM | TEMPERATURE: 97.9 F | BODY MASS INDEX: 25.77 KG/M2 | HEART RATE: 89 BPM | OXYGEN SATURATION: 97 % | DIASTOLIC BLOOD PRESSURE: 57 MMHG | SYSTOLIC BLOOD PRESSURE: 130 MMHG

## 2020-07-06 PROCEDURE — 73140 X-RAY EXAM OF FINGER(S): CPT

## 2020-07-06 PROCEDURE — 99283 EMERGENCY DEPT VISIT LOW MDM: CPT

## 2020-07-06 PROCEDURE — 29125 APPL SHORT ARM SPLINT STATIC: CPT

## 2020-07-07 ASSESSMENT — ENCOUNTER SYMPTOMS
COLOR CHANGE: 0
COUGH: 0
APNEA: 0
SHORTNESS OF BREATH: 0
BACK PAIN: 0
EYE ITCHING: 0
PHOTOPHOBIA: 0
EYE DISCHARGE: 0

## 2020-07-07 NOTE — ED PROVIDER NOTES
[facility] EMERGENCY DEPT  eMERGENCYdEPARTMENT eNCOUnter      Pt Name: Kemi Carrion  MRN: [id number]  [place] 1960  Date of evaluation: 7/6/2020  Provider:JESSICA Varela    CHIEF COMPLAINT       Chief Complaint   Patient presents with    Finger Injury     left 4th digit pain after  dogs. HISTORY OF PRESENT ILLNESS  (Location/Symptom, Timing/Onset, Context/Setting, Quality, Duration, Modifying Factors, Severity.)   Kemi Carrion is a 61 y.o. male who presents to the emergency department with complaints of left 4th digit distal phalanx concern for extensor tendon injury. He doesn't think he \"jammed it. \" At this time has difficulty with extension he is able to flex willingly. No other complaints he denies any pain at present. HPI    Nursing Notes were reviewed and I agree. REVIEW OF SYSTEMS    (2-9 systems for level 4, 10 or more for level 5)     Review of Systems   Constitutional: Negative for activity change, appetite change, chills and fever. HENT: Negative for congestion and dental problem. Eyes: Negative for photophobia, discharge and itching. Respiratory: Negative for apnea, cough and shortness of breath. Cardiovascular: Negative for chest pain. Musculoskeletal: Negative for arthralgias, back pain, gait problem, myalgias and neck pain. Range of motion issue   Skin: Negative for color change, pallor and rash. Neurological: Negative for dizziness, seizures and syncope. Psychiatric/Behavioral: Negative for agitation. The patient is not nervous/anxious. Except as noted above the remainder of the review of systems was reviewed and negative. PAST MEDICAL HISTORY   No past medical history on file.       SURGICAL HISTORY       Past Surgical History:   Procedure Laterality Date    PROSTATE SURGERY           CURRENT MEDICATIONS       Discharge Medication List as of 7/6/2020  8:43 PM      CONTINUE these medications which have NOT CHANGED    Details Esomeprazole Magnesium (NEXIUM 24HR PO) Take by mouthHistorical Med             ALLERGIES     Patient has no known allergies.     FAMILY HISTORY       Family History   Problem Relation Age of Onset    Breast Cancer Sister           SOCIAL HISTORY       Social History     Socioeconomic History    Marital status:      Spouse name: Not on file    Number of children: Not on file    Years of education: Not on file    Highest education level: Not on file   Occupational History    Not on file   Social Needs    Financial resource strain: Not on file    Food insecurity     Worry: Not on file     Inability: Not on file    Transportation needs     Medical: Not on file     Non-medical: Not on file   Tobacco Use    Smoking status: Never Smoker    Smokeless tobacco: Never Used   Substance and Sexual Activity    Alcohol use: Not on file    Drug use: Not on file    Sexual activity: Not on file   Lifestyle    Physical activity     Days per week: Not on file     Minutes per session: Not on file    Stress: Not on file   Relationships    Social connections     Talks on phone: Not on file     Gets together: Not on file     Attends Zoroastrianism service: Not on file     Active member of club or organization: Not on file     Attends meetings of clubs or organizations: Not on file     Relationship status: Not on file    Intimate partner violence     Fear of current or ex partner: Not on file     Emotionally abused: Not on file     Physically abused: Not on file     Forced sexual activity: Not on file   Other Topics Concern    Not on file   Social History Narrative    Not on file       SCREENINGS    Goodrich Coma Scale  Eye Opening: Spontaneous  Best Verbal Response: Oriented  Best Motor Response: Obeys commands  Goodrich Coma Scale Score: 15      PHYSICAL EXAM    (up to 7 forlevel 4, 8 or more for level 5)     ED Triage Vitals [07/06/20 1902]   BP Temp Temp Source Pulse Resp SpO2 Height Weight   (!) 130/57 97.9 °F (36.6 °C) Oral 89 16 97 % 5' 9\" (1.753 m) 174 lb (78.9 kg)       Physical Exam  Vitals signs and nursing note reviewed. Constitutional:       General: He is not in acute distress. Appearance: Normal appearance. He is well-developed. He is not diaphoretic. HENT:      Head: Normocephalic and atraumatic. Right Ear: Tympanic membrane, ear canal and external ear normal.      Left Ear: Tympanic membrane, ear canal and external ear normal.      Nose: Nose normal.      Mouth/Throat:      Mouth: Mucous membranes are moist.   Eyes:      Pupils: Pupils are equal, round, and reactive to light. Neck:      Musculoskeletal: Normal range of motion and neck supple. Trachea: No tracheal deviation. Cardiovascular:      Rate and Rhythm: Normal rate and regular rhythm. Pulses: Normal pulses. Heart sounds: Normal heart sounds. No murmur. Pulmonary:      Effort: Pulmonary effort is normal.      Breath sounds: Normal breath sounds. No stridor. No wheezing. Chest:      Chest wall: No tenderness. Abdominal:      General: Bowel sounds are normal. There is no distension. Palpations: Abdomen is soft. Tenderness: There is no abdominal tenderness. Musculoskeletal:         General: Signs of injury present. No tenderness. Comments: Patient has difficulty extension as baseline and against resistance. Skin:     General: Skin is warm and dry. Capillary Refill: Capillary refill takes less than 2 seconds. Neurological:      General: No focal deficit present. Mental Status: He is alert and oriented to person, place, and time. Mental status is at baseline. Psychiatric:         Mood and Affect: Mood normal.         Behavior: Behavior normal.         Thought Content:  Thought content normal.         Judgment: Judgment normal.           DIAGNOSTIC RESULTS     RADIOLOGY:   Non-plain film images such as CT, Ultrasound and MRI are read by the radiologist. Plain radiographic images are visualized and preliminarilyinterpreted by No att. providers found with the below findings:      Interpretation per the Radiologist below, if available at the time of this note:    XR FINGER LEFT (MIN 2 VIEWS)   Final Result   1. No acute bony abnormality. Signed by Dr Derik Lay on 7/6/2020 8:22 PM          LABS:  Labs Reviewed - No data to display    All other labs were within normal range or notreturned as of this dictation. RE-ASSESSMENT        EMERGENCY DEPARTMENT COURSE and DIFFERENTIAL DIAGNOSIS/MDM:   Vitals:    Vitals:    07/06/20 1902 07/06/20 2125   BP: (!) 130/57 (!) 130/57   Pulse: 89 89   Resp: 16 16   Temp: 97.9 °F (36.6 °C) 97.9 °F (36.6 °C)   TempSrc: Oral Oral   SpO2: 97% 97%   Weight: 174 lb (78.9 kg)    Height: 5' 9\" (1.753 m)        MDM  Patient tolerates splinting well based on the physical exam findings I am very worried about a extensor tendon injury I would like him to follow-up with a hand specialist on call tomorrow without any pain I did not think any aquatics or anything indicated we have placed him in a splint he will follow with hand closely. PROCEDURES:    Splint Application  Date/Time: 7/7/2020 4:09 PM  Performed by: JESSICA Ceballos  Authorized by: JESSICA Ceballos     Consent:     Consent obtained:  Verbal    Consent given by:  Patient    Risks discussed:  Pain  Pre-procedure details:     Sensation:  Normal  Procedure details:     Laterality:  Left    Location:  Finger    Finger:  L ring finger    Strapping: yes      Cast type:  Finger    Splint type:  Thumb spica    Supplies:  Aluminum splint  Post-procedure details:     Pain:  Unchanged    Sensation:  Normal    Patient tolerance of procedure: Tolerated well, no immediate complications          FINAL IMPRESSION      1.  Injury of left ring finger, initial encounter          DISPOSITION/PLAN   DISPOSITION Decision To Discharge 07/06/2020 08:41:30 PM      PATIENT REFERRED TO:  [facility] EMERGENCY DEPT  [address] [facility]

## 2020-07-07 NOTE — DISCHARGE INSTR - COC
Continuity of Care Form    Patient Name: Rylan Concepcion   :  1960  MRN:  [id number]    Admit date:  2020  Discharge date:  ***    Code Status Order: No Order   Advance Directives:     Admitting Physician:  No admitting provider for patient encounter.   PCP: Walton Bosworth, MD    Discharging Nurse: [facility] Unit/Room#: HÉCTOR Pascal 19  Discharging Unit Phone Number: ***    Emergency Contact:   Extended Emergency Contact Information  Primary Emergency Contact: Tanika Vickers  Address: [address] [facility] [address] Phone: [phone]  Relation: Spouse    Past Surgical History:  Past Surgical History:   Procedure Laterality Date    PROSTATE SURGERY         Immunization History:   Immunization History   Administered Date(s) Administered    Tdap (Boostrix, Adacel) 2014       Active Problems:  Patient Active Problem List   Diagnosis Code    Gastroesophageal reflux disease without esophagitis K21.9    Benign prostatic hyperplasia without lower urinary tract symptoms N40.0    Osteoarthritis M19.90    Other irritable bowel syndrome K58.8    Elevated PSA R97.20    Hypercholesteremia E78.00       Isolation/Infection:   Isolation          No Isolation        Patient Infection Status     None to display          Nurse Assessment:  Last Vital Signs: BP (!) 130/57   Pulse 89   Temp 97.9 °F (36.6 °C) (Oral)   Resp 16   Ht 5' 9\" (1.753 m)   Wt 174 lb (78.9 kg)   SpO2 97%   BMI 25.70 kg/m²     Last documented pain score (0-10 scale):    Last Weight:   Wt Readings from Last 1 Encounters:   20 174 lb (78.9 kg)     Mental Status:  {IP PT MENTAL STATUS:21165}    IV Access:  [facility] IV ACCESS:773181305}    Nursing Mobility/ADLs:  Walking   {CHP DME XIJF:786165169}  Transfer  {CHP DME JOPZ:329287294}  Bathing  {CHP DME SKWT:825906217}  Dressing  {CHP DME JNOA:158708988}  Toileting  {CHP DME NGGN:422734052}  Feeding  {CHP DME SERW:677711505}  Med Admin  {[facility] DME FYYR:280722503}  Med Delivery   { [facility] MED Delivery:455821132}    Wound Care Documentation and Therapy:        Elimination:  Continence:   · Bowel: {YES / CS:23575}  · Bladder: {YES / RU:03301}  Urinary Catheter: {Urinary Catheter:455546673}   Colostomy/Ileostomy/Ileal Conduit: {YES / WW:72531}       Date of Last BM: ***  No intake or output data in the 24 hours ending 208  No intake/output data recorded.     Safety Concerns:     [employer] Safety Concerns:172867459}    Impairments/Disabilities:      [employer] Impairments/Disabilities:394970825}    Nutrition Therapy:  Current Nutrition Therapy:   [employer] Diet List:715764853}    Routes of Feeding: {CHP DME Other Feedings:524727730}  Liquids: {Slp liquid thickness:59760}  Daily Fluid Restriction: {CHP DME Yes amt example:076383975}  Last Modified Barium Swallow with Video (Video Swallowing Test): {Done Not Done HNZY:910271445}    Treatments at the Time of Hospital Discharge:   Respiratory Treatments: ***  Oxygen Therapy:  {Therapy; copd oxygen:68206}  Ventilator:    { CC Vent ZMYZ:067518738}    Rehab Therapies: {THERAPEUTIC INTERVENTION:1236860173}  Weight Bearing Status/Restrictions: [address] [employer] Weight Bearin}  Other Medical Equipment (for information only, NOT a DME order):  {EQUIPMENT:449884261}  Other Treatments: ***    Patient's personal belongings (please select all that are sent with patient):  {[facility] DME Belongings:274452014}    RN SIGNATURE:  {Esignature:392587341}    CASE MANAGEMENT/SOCIAL WORK SECTION    Inpatient Status Date: ***    Readmission Risk Assessment Score:  Readmission Risk              Risk of Unplanned Readmission:        0           Discharging to Facility/ Agency   · Name:   · Address:  · Phone:  · Fax:    Dialysis Facility (if applicable)   · Name:  · Address:  · Dialysis Schedule:  · Phone:  · Fax:    / signature: {Esignature:969890169}    PHYSICIAN

## 2021-12-06 ENCOUNTER — OFFICE VISIT (OUTPATIENT)
Dept: GASTROENTEROLOGY | Facility: CLINIC | Age: 61
End: 2021-12-06

## 2021-12-06 VITALS
BODY MASS INDEX: 26.07 KG/M2 | WEIGHT: 176 LBS | SYSTOLIC BLOOD PRESSURE: 112 MMHG | OXYGEN SATURATION: 99 % | HEART RATE: 63 BPM | TEMPERATURE: 97.3 F | HEIGHT: 69 IN | DIASTOLIC BLOOD PRESSURE: 74 MMHG

## 2021-12-06 DIAGNOSIS — Z12.11 ENCOUNTER FOR SCREENING FOR MALIGNANT NEOPLASM OF COLON: Primary | ICD-10-CM

## 2021-12-06 PROCEDURE — S0285 CNSLT BEFORE SCREEN COLONOSC: HCPCS | Performed by: CLINICAL NURSE SPECIALIST

## 2021-12-06 RX ORDER — MULTIPLE VITAMINS W/ MINERALS TAB 9MG-400MCG
1 TAB ORAL DAILY
COMMUNITY

## 2021-12-06 RX ORDER — FEXOFENADINE HCL 180 MG/1
180 TABLET ORAL DAILY
COMMUNITY

## 2021-12-06 RX ORDER — ESOMEPRAZOLE MAGNESIUM 40 MG/1
40 CAPSULE, DELAYED RELEASE ORAL DAILY PRN
COMMUNITY

## 2021-12-06 RX ORDER — SODIUM PICOSULFATE, MAGNESIUM OXIDE, AND ANHYDROUS CITRIC ACID 10; 3.5; 12 MG/160ML; G/160ML; G/160ML
160 LIQUID ORAL TAKE AS DIRECTED
Qty: 160 ML | Refills: 0 | Status: SHIPPED | OUTPATIENT
Start: 2021-12-06 | End: 2022-01-25 | Stop reason: HOSPADM

## 2021-12-06 NOTE — PROGRESS NOTES
Corby Woodard  1960 12/6/2021  Chief Complaint   Patient presents with   • Colon Cancer Screening     Pt presents today for evaluation for colonoscopy-pt's last colon was 2/24/2012     Subjective   HPI  Corby Woodard is a 61 y.o. male who presents as a referral for preventative maintenance. He has no complaints of nausea or vomiting. No change in bowels. No wt loss. No BRBPR. No melena. There is NO family hx for colon cancer. No abdominal pain.  History reviewed. No pertinent past medical history.  Past Surgical History:   Procedure Laterality Date   • BICEPS TENDON REPAIR  2018   • COLONOSCOPY  02/24/2012    Normal exam repeat in 10 years   • PROSTATE SURGERY      TURP X 2     Outpatient Medications Marked as Taking for the 12/6/21 encounter (Office Visit) with Lisa Lomeli APRN   Medication Sig Dispense Refill   • esomeprazole (nexIUM) 40 MG capsule Take 40 mg by mouth Daily As Needed.     • fexofenadine (ALLEGRA) 180 MG tablet Take 180 mg by mouth Daily.     • multivitamin with minerals (Multivitamin Adults 50+) tablet tablet Take 1 tablet by mouth Daily.       No Known Allergies  Social History     Socioeconomic History   • Marital status:    Tobacco Use   • Smoking status: Never Smoker   • Smokeless tobacco: Never Used   Vaping Use   • Vaping Use: Never used   Substance and Sexual Activity   • Alcohol use: Not Currently   • Sexual activity: Defer     Family History   Problem Relation Age of Onset   • No Known Problems Father    • Colon cancer Neg Hx    • Colon polyps Neg Hx    • Esophageal cancer Neg Hx    • Liver cancer Neg Hx    • Liver disease Neg Hx    • Stomach cancer Neg Hx    • Rectal cancer Neg Hx      Health Maintenance   Topic Date Due   • ANNUAL PHYSICAL  Never done   • COVID-19 Vaccine (1) Never done   • ZOSTER VACCINE (1 of 2) Never done   • HEPATITIS C SCREENING  Never done   • COLORECTAL CANCER SCREENING  02/24/2022   • TDAP/TD VACCINES (2 - Td or Tdap) 08/05/2024  "  • INFLUENZA VACCINE  Completed   • Pneumococcal Vaccine 0-64  Aged Out       REVIEW OF SYSTEMS  General: well appearing, no fever chills or sweats, no unexplained wt loss  HEENT: no acute visual or hearing disturbances  Cardiovascular: No chest pain or palpitations  Pulmonary: No shortness of breath, coughing, wheezing or hemoptysis  : No burning, urgency, hematuria, or dysuria  Musculoskeletal: No joint pain or stiffness  Peripheral: no edema  Skin: No lesions or rashes  Neuro: No dizziness, headaches, stroke, syncope  Endocrine: No hot or cold intolerances  Hematological: No blood dyscrasias    Objective   Vitals:    12/06/21 0833   BP: 112/74   BP Location: Left arm   Patient Position: Sitting   Cuff Size: Adult   Pulse: 63   Temp: 97.3 °F (36.3 °C)   TempSrc: Infrared   SpO2: 99%   Weight: 79.8 kg (176 lb)   Height: 175.3 cm (69\")     Body mass index is 25.99 kg/m².  Patient's Body mass index is 25.99 kg/m². indicating that he is overweight (BMI 25-29.9). Obesity-related health conditions include the following: none. Obesity is unchanged. BMI is is above average; BMI management plan is completed. We discussed portion control and increasing exercise..      PHYSICAL EXAM  General: age appropriate well nourished well appearing, no acute distress  Head: normocephalic and atraumatic  Global assessment-supple  Neck-No JVD noted, no lymphadenopathy  Pulmonary-clear to auscultation bilaterally, normal respiratory effort  Cardiovascular-normal rate and rhythm, normal heart sounds, S1 and S2 noted  Abdomen-soft, non tender, non distended, normal bowel sounds all 4 quadrants, no hepatosplenomegaly noted  Extremities-No clubbing cyanosis or edema  Neuro-Non focal, converses appropriately, awake, alert, oriented    Assessment/Plan     Diagnoses and all orders for this visit:    1. Encounter for screening for malignant neoplasm of colon (Primary)  -     Case Request; Standing  -     Case Request  -     Sod Picosulfate-Mag " Ox-Cit Acd (Clenpiq) 10-3.5-12 MG-GM -GM/160ML solution; Take 160 mL by mouth Take As Directed.  Dispense: 160 mL; Refill: 0        COLONOSCOPY WITH ANESTHESIA (N/A)  Body mass index is 25.99 kg/m².    Patient instructions on prep prior to procedure provided to the patient.    All risks, benefits, alternatives, and indications of colonoscopy procedure have been discussed with the patient. Risks to include perforation of the colon requiring possible surgery or colostomy, risk of bleeding from biopsies or removal of colon tissue, possibility of missing a colon polyp or cancer, or adverse drug reaction.  Benefits to include the diagnosis and management of disease of the colon and rectum. Alternatives to include barium enema, radiographic evaluation, lab testing or no intervention. Pt verbalizes understanding and agrees.     Lisa Lomeli, DIANELYS  2021  08:55 CST      IF YOU SMOKE OR USE TOBACCO PLEASE READ THE FOLLOWIN minutes reading provided    Why is smoking bad for me?  Smoking increases the risk of heart disease, lung disease, vascular disease, stroke, and cancer.     If you smoke, STOP!    If you would like more information on quitting smoking, please visit the [employer] website: [email or web address]   This link will provide additional resources including the QUIT line and the Beat the Pack support groups.     For more information:    Quit Now [place]  -QUIT-NOW  [email or web address]

## 2022-01-25 ENCOUNTER — ANESTHESIA (OUTPATIENT)
Dept: GASTROENTEROLOGY | Facility: HOSPITAL | Age: 62
End: 2022-01-25

## 2022-01-25 ENCOUNTER — HOSPITAL ENCOUNTER (OUTPATIENT)
Facility: HOSPITAL | Age: 62
Setting detail: HOSPITAL OUTPATIENT SURGERY
Discharge: HOME | End: 2022-01-25
Attending: INTERNAL MEDICINE | Admitting: INTERNAL MEDICINE

## 2022-01-25 ENCOUNTER — ANESTHESIA EVENT (OUTPATIENT)
Dept: GASTROENTEROLOGY | Facility: HOSPITAL | Age: 62
End: 2022-01-25

## 2022-01-25 VITALS
HEART RATE: 71 BPM | WEIGHT: 178 LBS | HEIGHT: 69 IN | TEMPERATURE: 97.4 F | SYSTOLIC BLOOD PRESSURE: 118 MMHG | DIASTOLIC BLOOD PRESSURE: 57 MMHG | RESPIRATION RATE: 15 BRPM | OXYGEN SATURATION: 93 % | BODY MASS INDEX: 26.36 KG/M2

## 2022-01-25 DIAGNOSIS — Z12.11 ENCOUNTER FOR SCREENING FOR MALIGNANT NEOPLASM OF COLON: ICD-10-CM

## 2022-01-25 PROCEDURE — 45378 DIAGNOSTIC COLONOSCOPY: CPT | Performed by: INTERNAL MEDICINE

## 2022-01-25 PROCEDURE — 25010000002 PROPOFOL 10 MG/ML EMULSION: Performed by: NURSE ANESTHETIST, CERTIFIED REGISTERED

## 2022-01-25 RX ORDER — SODIUM CHLORIDE 0.9 % (FLUSH) 0.9 %
10 SYRINGE (ML) INJECTION AS NEEDED
Status: DISCONTINUED | OUTPATIENT
Start: 2022-01-25 | End: 2022-01-25 | Stop reason: HOSPADM

## 2022-01-25 RX ORDER — SODIUM CHLORIDE 9 MG/ML
500 INJECTION, SOLUTION INTRAVENOUS CONTINUOUS PRN
Status: DISCONTINUED | OUTPATIENT
Start: 2022-01-25 | End: 2022-01-25 | Stop reason: HOSPADM

## 2022-01-25 RX ORDER — LIDOCAINE HYDROCHLORIDE 10 MG/ML
0.5 INJECTION, SOLUTION EPIDURAL; INFILTRATION; INTRACAUDAL; PERINEURAL ONCE AS NEEDED
Status: CANCELLED | OUTPATIENT
Start: 2022-01-25

## 2022-01-25 RX ORDER — PROPOFOL 10 MG/ML
VIAL (ML) INTRAVENOUS AS NEEDED
Status: DISCONTINUED | OUTPATIENT
Start: 2022-01-25 | End: 2022-01-25 | Stop reason: SURG

## 2022-01-25 RX ADMIN — PROPOFOL 100 MG: 10 INJECTION, EMULSION INTRAVENOUS at 08:25

## 2022-01-25 RX ADMIN — PROPOFOL 100 MG: 10 INJECTION, EMULSION INTRAVENOUS at 08:32

## 2022-01-25 RX ADMIN — PROPOFOL 100 MG: 10 INJECTION, EMULSION INTRAVENOUS at 08:16

## 2022-01-25 RX ADMIN — SODIUM CHLORIDE 500 ML: 9 INJECTION, SOLUTION INTRAVENOUS at 08:09

## 2022-01-25 RX ADMIN — PROPOFOL 100 MG: 10 INJECTION, EMULSION INTRAVENOUS at 08:19

## 2022-01-25 NOTE — ANESTHESIA POSTPROCEDURE EVALUATION
Patient: Corby Woodard    Procedure Summary     Date: 01/25/22 Room / Location: [facility] ENDOSCOPY 2 /  [facility]    Anesthesia Start: 0815 Anesthesia Stop: 0842    Procedure: COLONOSCOPY WITH ANESTHESIA (N/A ) Diagnosis:       Encounter for screening for malignant neoplasm of colon      (Encounter for screening for malignant neoplasm of colon [Z12.11])    Surgeons: Delta Oneal MD Provider: Jose Dawson CRNA    Anesthesia Type: MAC ASA Status: 2          Anesthesia Type: MAC    Vitals  Vitals Value Taken Time   BP 98/54 01/25/22 0846   Temp     Pulse 74 01/25/22 0848   Resp 14 01/25/22 0845   SpO2 96 % 01/25/22 0848   Vitals shown include unvalidated device data.        Post Anesthesia Care and Evaluation    Patient location during evaluation: PHASE II  Patient participation: complete - patient participated  Level of consciousness: awake and alert  Pain score: 0  Pain management: adequate  Airway patency: patent  Anesthetic complications: No anesthetic complications  PONV Status: none  Cardiovascular status: acceptable and stable  Respiratory status: acceptable  Hydration status: acceptable

## 2022-01-25 NOTE — ANESTHESIA PREPROCEDURE EVALUATION
Anesthesia Evaluation     Patient summary reviewed and Nursing notes reviewed   no history of anesthetic complications:  NPO Solid Status: > 8 hours  NPO Liquid Status: > 2 hours           Airway   Mallampati: I  TM distance: >3 FB  Neck ROM: full  No difficulty expected  Dental          Pulmonary    (-) sleep apnea, not a smoker  Cardiovascular   Exercise tolerance: good (4-7 METS)    (-) hypertension, CAD      Neuro/Psych  (-) seizures, TIA, CVA  GI/Hepatic/Renal/Endo    (+)  GERD,    (-) liver disease, no renal disease, diabetes    Musculoskeletal     Abdominal    Substance History      OB/GYN          Other                      Anesthesia Plan    ASA 2     MAC     intravenous induction     Anesthetic plan, all risks, benefits, and alternatives have been provided, discussed and informed consent has been obtained with: patient.        CODE STATUS:

## 2022-01-25 NOTE — H&P
"[facility]  Pre Procedure History & Physical    Chief Complaint:   Screening    Subjective     HPI:   Here for screening colonoscopy.  Last exam 10 years prior.  Patient with no symptoms.    Past Medical History:   Past Medical History:   Diagnosis Date   • GERD (gastroesophageal reflux disease)        Past Surgical History:  Past Surgical History:   Procedure Laterality Date   • BICEPS TENDON REPAIR  2018   • COLONOSCOPY  02/24/2012    Normal exam repeat in 10 years   • PROSTATE SURGERY      TURP X 2       Family History:  Family History   Problem Relation Age of Onset   • No Known Problems Father    • Colon cancer Neg Hx    • Colon polyps Neg Hx    • Esophageal cancer Neg Hx    • Liver cancer Neg Hx    • Liver disease Neg Hx    • Stomach cancer Neg Hx    • Rectal cancer Neg Hx        Social History:   reports that he has never smoked. He has never used smokeless tobacco. He reports previous alcohol use. He reports that he does not use drugs.    Medications:   Prior to Admission medications    Medication Sig Start Date End Date Taking? Authorizing Provider   esomeprazole (nexIUM) 40 MG capsule Take 40 mg by mouth Daily As Needed.   Yes Elias Marin MD   fexofenadine (ALLEGRA) 180 MG tablet Take 180 mg by mouth Daily.   Yes Elias Marin MD   Sod Picosulfate-Mag Ox-Cit Acd (Clenpiq) 10-3.5-12 MG-GM -GM/160ML solution Take 160 mL by mouth Take As Directed. 12/6/21  Yes Lisa Lomeli APRN   multivitamin with minerals (Multivitamin Adults 50+) tablet tablet Take 1 tablet by mouth Daily.    ProviderElias MD       Allergies:  Patient has no known allergies.    Objective     Blood pressure 120/52, pulse 77, temperature 97.4 °F (36.3 °C), temperature source Temporal, resp. rate 18, height 175.3 cm (69\"), weight 80.7 kg (178 lb), SpO2 98 %.    Physical Exam   Constitutional: Pt is oriented to person, place, and in no distress.   HENT: Mouth/Throat: Oropharynx is clear. "   Cardiovascular: Normal rate, regular rhythm.    Pulmonary/Chest: Effort normal. No respiratory distress. No  wheezes.   Abdominal: Soft. Non-distended.  Skin: Skin is warm and dry.   Psychiatric: Mood, memory, affect and judgment appear normal.     Assessment/Plan     Diagnosis:  Screening colonoscopy    Anticipated Surgical Procedure:    Proceed with colonoscopy as scheduled    The following major R/B/A were discussed with the patient, however the list is not all inclusive . Risk:  Bleeding (immediate and delayed), perforation (rupture or tear), reaction to medication, missed lesion/cancer, pain during the procedure, infection, need for surgery, need for ostomy, need for mechanical ventilation (breathing machine), death.  Benefits: removal of polyp/tissue, burn/clip/or inject to stop bleeding, removal of foreign body, dilate any stricture.  Alternatives: Xray or CT, surgery, do nothing with associated risk   The patient was given time to ask question and received explanation, and agrees to proceed as per History and Physical.   No guarantee given or expressed.    EMR Dragon/transcription disclaimer: Much of this encounter note is an electronic transcription/translation of spoken language to printed text.  The electronic translation of spoken language may permit erroneous, or at times, nonsensical words or phrases to be inadvertently transcribed.  Although I have reviewed the note for such errors, some may still exist.    Delta Oneal MD  08:15 CST  1/25/2022

## 2022-02-04 ENCOUNTER — TELEPHONE (OUTPATIENT)
Dept: GASTROENTEROLOGY | Facility: CLINIC | Age: 62
End: 2022-02-04

## (undated) DEVICE — TBG SMPL FLTR LINE NASL 02/C02 A/ BX/100

## (undated) DEVICE — Device: Brand: DEFENDO AIR/WATER/SUCTION AND BIOPSY VALVE

## (undated) DEVICE — YANKAUER,BULB TIP WITH VENT: Brand: ARGYLE

## (undated) DEVICE — MASK,OXYGEN,MED CONC,ADLT,7' TUB, UC: Brand: PENDING

## (undated) DEVICE — THE CHANNEL CLEANING BRUSH IS A NYLON FLEXI BRUSH ATTACHED TO A FLEXIBLE PLASTIC SHEATH DESIGNED TO SAFELY REMOVE DEBRIS FROM FLEXIBLE ENDOSCOPES.

## (undated) DEVICE — CUFF,BP,DISP,1 TUBE,ADULT,HP: Brand: MEDLINE

## (undated) DEVICE — SENSR O2 OXIMAX FNGR A/ 18IN NONSTR